# Patient Record
Sex: FEMALE
[De-identification: names, ages, dates, MRNs, and addresses within clinical notes are randomized per-mention and may not be internally consistent; named-entity substitution may affect disease eponyms.]

---

## 2018-03-17 ENCOUNTER — HOSPITAL ENCOUNTER (EMERGENCY)
Dept: HOSPITAL 97 - ER | Age: 23
Discharge: HOME | End: 2018-03-17
Payer: MEDICAID
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
## 2018-03-17 DIAGNOSIS — S61.211A: Primary | ICD-10-CM
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
## 2018-03-17 PROCEDURE — 99281 EMR DPT VST MAYX REQ PHY/QHP: CPT

## 2018-03-17 PROCEDURE — 90714 TD VACC NO PRESV 7 YRS+ IM: CPT

## 2018-03-17 NOTE — EDPHYS
Physician Documentation                                                                           

 [facility]                                                                

Name: Ashley Solorzano                                                                                 

Age: 22 yrs                                                                                       

Sex: Female                                                                                       

: 1995                                                                                   

MRN: [id number]                                                                                   

Arrival Date: 2018                                                                          

Time: 11:06                                                                                       

Account#: [id number]                                                                            

Bed 12                                                                                            

Private MD:                                                                                       

ED Physician Lani Dockery                                                                    

HPI:                                                                                              

                                                                                             

12:30 This 22 yrs old  Female presents to ER via Ambulatory with complaints of Finger kb  

      Laceration.                                                                                 

12:30 The patient has a laceration related to: cooking, from a knife, occurred at home, and   kb  

      there are no complicating factors. The injury was accidental. The laceration(s) is(are)     

      located on the dorsal aspect of middle phalanx of left index finger. Onset: The             

      symptoms/episode began/occurred last night. Associated signs and symptoms: The patient      

      has no apparent associated signs or symptoms. The patient has not experienced similar       

      symptoms in the past. The patient has not recently seen a physician.                        

                                                                                                  

OB/GYN:                                                                                           

11:16 LMP 2018                                                                             

                                                                                                  

Historical:                                                                                       

- Allergies:                                                                                      

11:15 No Known Allergies;                                                                       

- Home Meds:                                                                                      

11:15 None [Active];                                                                          hj  

- PMHx:                                                                                           

11:15 None;                                                                                   hj  

- PSHx:                                                                                           

11:15 None;                                                                                   hj  

                                                                                                  

- Immunization history:: Adult Immunizations unknown.                                             

- Social history:: Smoking status: Patient/guardian denies using tobacco.                         

                                                                                                  

                                                                                                  

ROS:                                                                                              

12:30 Constitutional: Negative for fever, chills, and weight loss, Neck: Negative for injury, kb  

      pain, and swelling, Cardiovascular: Negative for chest pain, palpitations, and edema,       

      Respiratory: Negative for shortness of breath, cough, wheezing, and pleuritic chest         

      pain, Abdomen/GI: Negative for abdominal pain, nausea, vomiting, diarrhea, and              

      constipation, Back: Negative for injury and pain, Neuro: Negative for headache,             

      weakness, numbness, tingling, and seizure.                                                  

12:30 Skin: Positive for laceration(s), of the dorsal aspect of middle phalanx of left index      

      finger.                                                                                     

                                                                                                  

Exam:                                                                                             

12:30 Constitutional:  This is a well developed, well nourished patient who is awake, alert,  kb  

      and in no acute distress. Head/Face:  Normocephalic, atraumatic. Chest/axilla:  Normal      

      chest wall appearance and motion.  Nontender with no deformity.  No lesions are             

      appreciated. Cardiovascular:  Regular rate and rhythm with a normal S1 and S2.  No          

      gallops, murmurs, or rubs.  Normal PMI, no JVD.  No pulse deficits. Respiratory:  Lungs     

      have equal breath sounds bilaterally, clear to auscultation and percussion.  No rales,      

      rhonchi or wheezes noted.  No increased work of breathing, no retractions or nasal          

      flaring. Abdomen/GI:  Soft, non-tender, with normal bowel sounds.  No distension or         

      tympany.  No guarding or rebound.  No evidence of tenderness throughout. Back:  No          

      spinal tenderness.  No costovertebral tenderness.  Full range of motion. MS/ Extremity:     

       Pulses equal, no cyanosis.  Neurovascular intact.  Full, normal range of motion.           

      Neuro:  Awake and alert, GCS 15, oriented to person, place, time, and situation.            

      Cranial nerves II-XII grossly intact.  Motor strength 5/5 in all extremities.  Sensory      

      grossly intact.  Cerebellar exam normal.  Normal gait.                                      

12:30 Skin: injury, laceration(s), the wound is approximately  1.5 cm(s), of the dorsal           

      aspect of middle phalanx of left index finger, that can be described as clean, no           

      foreign body, irregular, without bleeding.                                                  

                                                                                                  

Vital Signs:                                                                                      

11:16  / 65; Pulse 75; Resp 18; Temp 97.2(TE); Pulse Ox 100% on R/A; Weight 48.99 kg;   hj  

      Height 5 ft. 3 in. (160.02 cm); Pain 5/10;                                                  

11:16 Body Mass Index 19.13 (48.99 kg, 160.02 cm)                                               

                                                                                                  

MDM:                                                                                              

11:31 Patient medically screened.                                                             kb  

12:30 Data reviewed: vital signs, nurses notes. Data interpreted: Pulse oximetry: on room air kb  

      is 100 %. Interpretation: normal. Counseling: I had a detailed discussion with the          

      patient and/or guardian regarding: the historical points, exam findings, and any            

      diagnostic results supporting the discharge/admit diagnosis, the need for outpatient        

      follow up, a family practitioner, to return to the emergency department if symptoms         

      worsen or persist or if there are any questions or concerns that arise at home.             

                                                                                                  

                                                                                             

12:03 Order name: Wound Care: wash with clorahexadine and place steristrip; Complete Time:    kb  

      13:03                                                                                       

                                                                                             

12:03 Order name: Finger Splint; Complete Time: 13:03                                         kb  

                                                                                                  

Administered Medications:                                                                         

13:02 Not Given (pt refused. Pt and  reports, "We are doing natural things."):         brain  

      Tetanus-Diphtheria Toxoid Adult 0.5 ml IM once                                              

                                                                                                  

                                                                                                  

Disposition:                                                                                      

18:39 Co-signature as Attending Physician, Lani Dockery MD.                               ma2 

                                                                                                  

Disposition:                                                                                      

18 12:33 Discharged to Home. Impression: Laceration without foreign body of left index      

  finger without damage to nail.                                                                  

- Condition is Stable.                                                                            

- Discharge Instructions: Non-Sutured Laceration.                                                 

                                                                                                  

- Medication Reconciliation Form, Thank You Letter, Antibiotic Education, Prescription            

  Opioid Use form.                                                                                

- Follow up: Emergency Department; When: As needed; Reason: Worsening of condition.               

  Follow up: Private Physician; When: 2 - 3 days; Reason: Recheck today's complaints,             

  Continuance of care, Re-evaluation by your physician.                                           

                                                                                                  

                                                                                                  

                                                                                                  

Signatures:                                                                                       

Shante Paul, YANIRA-GRACIELA DOYLE-Angelica Galeano RN RN ss Joaquin, Henry, RN RN hj Alzahri, Mohammad, MD MD   ma2                                                  

                                                                                                  

**************************************************************************************************

## 2018-03-17 NOTE — ER
Nurse's Notes                                                                                     

 [facility]                                                                

Name: Ashley Solorzano                                                                                 

Age: 22 yrs                                                                                       

Sex: Female                                                                                       

: 1995                                                                                   

MRN: [id number]                                                                                   

Arrival Date: 2018                                                                          

Time: 11:06                                                                                       

Account#: [id number]                                                                            

Bed 12                                                                                            

Private MD:                                                                                       

Diagnosis: Laceration without foreign body of left index finger without damage to nail            

                                                                                                  

Presentation:                                                                                     

                                                                                             

11:13 Presenting complaint: Patient states: last night, i was slicing tomatoes and i          hj  

      accidentally, cut my L index finger, around 8pm;. Transition of care: patient was not       

      received from another setting of care. Onset of symptoms was 2018. Care prior     

      to arrival: None.                                                                           

11:13 Method Of Arrival: Ambulatory                                                             

11:13 Acuity: SHAD 4                                                                           hj  

                                                                                                  

Triage Assessment:                                                                                

11:15 General: Appears in no apparent distress. uncomfortable, Behavior is calm, cooperative, hj  

      appropriate for age. Pain: Complains of pain in dorsal aspect of middle phalanx of left     

      index finger.                                                                               

                                                                                                  

OB/GYN:                                                                                           

11:16 LMP 2018                                                                             

                                                                                                  

Historical:                                                                                       

- Allergies:                                                                                      

11:15 No Known Allergies;                                                                     hj  

- Home Meds:                                                                                      

11:15 None [Active];                                                                          hj  

- PMHx:                                                                                           

11:15 None;                                                                                   hj  

- PSHx:                                                                                           

11:15 None;                                                                                   hj  

                                                                                                  

- Immunization history:: Adult Immunizations unknown.                                             

- Social history:: Smoking status: Patient/guardian denies using tobacco.                         

                                                                                                  

                                                                                                  

Screenin:26 Abuse screen: Denies threats or abuse. Denies injuries from another. Nutritional        ss  

      screening: No deficits noted. Tuberculosis screening: No symptoms or risk factors           

      identified. Never had TB. Fall Risk None identified.                                        

                                                                                                  

Assessment:                                                                                       

11:45 General: Appears in no apparent distress. comfortable, Behavior is cooperative,         ss  

      anxious, quiet. Pain: Complains of pain in dorsal aspect of middle phalanx of left          

      index finger Pain currently is 5 out of 10 on a pain scale. Quality of pain is              

      described as tender, Pain began last night Is continuous. Neuro: Level of Consciousness     

      is awake, alert, obeys commands. Cardiovascular: Capillary refill < 3 seconds is brisk      

      in bilateral fingers. Respiratory: Respiratory effort is even, unlabored. Derm: Skin is     

      intact, is healthy with good turgor, Skin is pink, warm \T\ dry. Injury Description:        

      Laceration sustained to dorsal aspect of middle phalanx of left index finger is clean,      

      0.5 to 2.5 cm long, no active bleeding noted at this time.                                  

                                                                                                  

Vital Signs:                                                                                      

11:16  / 65; Pulse 75; Resp 18; Temp 97.2(TE); Pulse Ox 100% on R/A; Weight 48.99 kg;   hj  

      Height 5 ft. 3 in. (160.02 cm); Pain 5/10;                                                  

11:16 Body Mass Index 19.13 (48.99 kg, 160.02 cm)                                             hj  

                                                                                                  

ED Course:                                                                                        

11:06 Patient arrived in ED.                                                                  mr  

11:14 Triage completed.                                                                       hj  

11:16 Arm band placed on right wrist.                                                         hj  

11:22 Angelica Bates, RN is Primary Nurse.                                                    ss  

11:23 Shante Paul FNP-C is PHCP.                                                        kb  

11:23 Lani Dockery MD is Attending Physician.                                           kb  

11:26 Patient has correct armband on for positive identification. Bed in low position. Call   ss  

      light in reach.                                                                             

13:03 No provider procedures requiring assistance completed. Patient did not have IV access   ss  

      during this emergency room visit. Dressings: pt washed hands with soap and water.           

      Hibiclens rinse. finger splint applied.                                                     

                                                                                                  

Administered Medications:                                                                         

13:02 Not Given (pt refused. Pt and  reports, "We are doing natural things."):         ss  

      Tetanus-Diphtheria Toxoid Adult 0.5 ml IM once                                              

                                                                                                  

                                                                                                  

Outcome:                                                                                          

12:33 Discharge ordered by MD.                                                                kb  

13:03 Discharged to home ambulatory, with family.                                             ss  

13:03 Condition: good                                                                             

13:03 Discharge instructions given to patient, family, Instructed on discharge instructions,      

      follow up and referral plans. Demonstrated understanding of instructions, follow-up         

      care.                                                                                       

13:05 Patient left the ED.                                                                    ss  

                                                                                                  

Signatures:                                                                                       

Shante Paul FNP-C FNP-Edin                                                   

Laurie Hernandez                                mr                                                   

Angelica Bates, RN                      RN                                                      

Ignacio Coronel RN RN   hj                                                   

                                                                                                  

Corrections: (The following items were deleted from the chart)                                    

11:25 11:13 Presenting complaint: Patient states: last night, i was slicing tomatoes and i    hj  

      accidentally, cut my L index, around 8pm; hj                                                

                                                                                                  

**************************************************************************************************

## 2018-05-30 ENCOUNTER — HOSPITAL ENCOUNTER (EMERGENCY)
Dept: HOSPITAL 97 - ER | Age: 23
Discharge: HOME | End: 2018-05-30
Payer: MEDICAID

## 2018-05-30 DIAGNOSIS — R53.81: ICD-10-CM

## 2018-05-30 DIAGNOSIS — E86.9: Primary | ICD-10-CM

## 2018-05-30 LAB
ALBUMIN SERPL BCP-MCNC: 4 G/DL (ref 3.2–5.5)
ALP SERPL-CCNC: 123 IU/L (ref 42–121)
ALT SERPL W P-5'-P-CCNC: 15 IU/L (ref 10–60)
AST SERPL W P-5'-P-CCNC: 20 IU/L (ref 10–42)
BLD SMEAR INTERP: (no result)
BUN BLD-MCNC: 9 MG/DL (ref 6–20)
GLUCOSE SERPLBLD-MCNC: 89 MG/DL (ref 65–120)
HCT VFR BLD CALC: 41.4 % (ref 36–45)
LIPASE SERPL-CCNC: 27 U/L (ref 22–51)
LYMPHOCYTES # SPEC AUTO: 1.2 K/UL (ref 0.7–4.9)
MCH RBC QN AUTO: 24.8 PG (ref 27–35)
MCV RBC: 78.9 FL (ref 80–100)
MORPHOLOGY BLD-IMP: (no result)
PMV BLD: 9.1 FL (ref 7.6–11.3)
POTASSIUM SERPL-SCNC: 3.7 MEQ/L (ref 3.6–5)
RBC # BLD: 5.25 M/UL (ref 3.86–4.86)
UA COMPLETE W REFLEX CULTURE PNL UR: (no result)

## 2018-05-30 PROCEDURE — 87086 URINE CULTURE/COLONY COUNT: CPT

## 2018-05-30 PROCEDURE — 81015 MICROSCOPIC EXAM OF URINE: CPT

## 2018-05-30 PROCEDURE — 81003 URINALYSIS AUTO W/O SCOPE: CPT

## 2018-05-30 PROCEDURE — 85025 COMPLETE CBC W/AUTO DIFF WBC: CPT

## 2018-05-30 PROCEDURE — 83690 ASSAY OF LIPASE: CPT

## 2018-05-30 PROCEDURE — 96361 HYDRATE IV INFUSION ADD-ON: CPT

## 2018-05-30 PROCEDURE — 84443 ASSAY THYROID STIM HORMONE: CPT

## 2018-05-30 PROCEDURE — 80076 HEPATIC FUNCTION PANEL: CPT

## 2018-05-30 PROCEDURE — 81025 URINE PREGNANCY TEST: CPT

## 2018-05-30 PROCEDURE — 80048 BASIC METABOLIC PNL TOTAL CA: CPT

## 2018-05-30 PROCEDURE — 96360 HYDRATION IV INFUSION INIT: CPT

## 2018-05-30 PROCEDURE — 36415 COLL VENOUS BLD VENIPUNCTURE: CPT

## 2018-05-30 PROCEDURE — 87088 URINE BACTERIA CULTURE: CPT

## 2018-05-30 PROCEDURE — 99284 EMERGENCY DEPT VISIT MOD MDM: CPT

## 2018-05-30 NOTE — EDPHYS
Physician Documentation                                                                           

 [facility]                                                                

Name: Ashley Solorzano                                                                                 

Age: 22 yrs                                                                                       

Sex: Female                                                                                       

: 1995                                                                                   

MRN: [id number]                                                                                   

Arrival Date: 2018                                                                          

Time: 10:54                                                                                       

Account#: [id number]                                                                            

Bed 15                                                                                            

Private MD: None, None                                                                            

ED Physician Beto Brantley                                                                       

HPI:                                                                                              

                                                                                             

13:27 This 22 yrs old  Female presents to ER via Ambulatory with complaints of        snw 

      Vomiting/Diarrhea.                                                                          

13:27 The patient presents to the emergency department with nausea, vomiting, diarrhea.       snw 

      Onset: The symptoms/episode began/occurred suddenly, 3 day(s) ago, and became               

      persistent. Possible causes: unknown. Associated signs and symptoms: The patient has no     

      apparent associated signs or symptoms. Severity of symptoms: At their worst the             

      symptoms were moderate severe. The patient has not experienced similar symptoms in the      

      past. The patient has not recently seen a physician. pt breastfeeding 7 month old,          

      recent 5 lb wt loss, lightheadedness, fatigue.                                              

                                                                                                  

OB/GYN:                                                                                           

11:01 LMP N/A - Breastfeeding                                                                 aa5 

                                                                                                  

Historical:                                                                                       

- Allergies:                                                                                      

11:00 No Known Allergies;                                                                     aa5 

- PMHx:                                                                                           

11:00 None;                                                                                   aa5 

- PSHx:                                                                                           

11:00 None;                                                                                   aa5 

                                                                                                  

- Immunization history:: Adult Immunizations up to date.                                          

- Social history:: Smoking status: Patient/guardian denies using tobacco.                         

- Ebola Screening: : No symptoms or risks identified at this time.                                

                                                                                                  

                                                                                                  

ROS:                                                                                              

13:26 Constitutional: Negative for fever, chills, Positive for fatigue, lightheadedness, and  snw 

      weight loss, Eyes: Negative for injury, pain, redness, and discharge, ENT: Negative for     

      injury, pain, and discharge, Neck: Negative for injury, pain, and swelling,                 

      Cardiovascular: Negative for chest pain, palpitations, and edema, Respiratory: Negative     

      for shortness of breath, cough, wheezing, and pleuritic chest pain, Back: Negative for      

      injury and pain, : Negative for injury, bleeding, discharge, and swelling,                

      MS/Extremity: Negative for injury and deformity.                                            

13:26 Skin: Negative for injury, rash, and discoloration, Psych: Negative for depression,         

      anxiety, suicide ideation, homicidal ideation, and hallucinations.                          

13:26 Abdomen/GI: Positive for nausea and vomiting, diarrhea.                                     

13:26 Neuro: Positive for lightheadedness.                                                        

                                                                                                  

Exam:                                                                                             

13:25 Constitutional:  This is a well developed, well nourished patient who is awake, alert,  snw 

      and in no acute distress.                                                                   

13:25 Head/Face:  Normocephalic, atraumatic. ENT:  Nares patent. No nasal discharge, no           

      septal abnormalities noted.  Tympanic membranes are normal and external auditory canals     

      are clear.  Oropharynx with no redness, swelling, or masses, exudates, or evidence of       

      obstruction, uvula midline.  Mucous membranes moist. Neck:  Trachea midline, no             

      thyromegaly or masses palpated, and no cervical lymphadenopathy.  Supple, full range of     

      motion without nuchal rigidity, or vertebral point tenderness.  No Meningismus.             

      Chest/axilla:  Normal chest wall appearance and motion.  Nontender with no deformity.       

      No lesions are appreciated. Cardiovascular:  Regular rate and rhythm with a normal S1       

      and S2.  No gallops, murmurs, or rubs.  Normal PMI, no JVD.  No pulse deficits.             

      Respiratory:  Lungs have equal breath sounds bilaterally, clear to auscultation and         

      percussion.  No rales, rhonchi or wheezes noted.  No increased work of breathing, no        

      retractions or nasal flaring. Back:  No spinal tenderness.  No costovertebral               

      tenderness.  Full range of motion. Skin:  Warm, dry with normal turgor.  Normal color       

      with no rashes, no lesions, and no evidence of cellulitis. MS/ Extremity:  Pulses           

      equal, no cyanosis.  Neurovascular intact.  Full, normal range of motion. Neuro:  Awake     

      and alert, GCS 15, oriented to person, place, time, and situation.  Cranial nerves          

      II-XII grossly intact.  Motor strength 5/5 in all extremities.  Sensory grossly intact.     

       Cerebellar exam normal.  Normal gait.                                                      

13:25 Eyes: Periorbital structures: ecchymosis, that is mild, bilaterally, Extraocular            

      movements: no acute changes, Conjunctiva: normal, Corneas: are normal.                      

13:25 Abdomen/GI: Inspection: abdomen appears normal, Bowel sounds: normal, Palpation:            

      abdomen is soft and non-tender, in all quadrants.                                           

                                                                                                  

Vital Signs:                                                                                      

11:01  / 63; Pulse 78; Resp 16 S; Temp 97.8; Pulse Ox 100% on R/A; Pain 0/10;           aa5 

12:00 Pulse 67; Resp 17; Pulse Ox 100% on R/A;                                                tw2 

12:48  / 78; Pulse 109; Resp 18; Pulse Ox 100% on R/A;                                  tw2 

14:01  / 56; Pulse 90; Resp 17; Pulse Ox 100% on R/A;                                   tw2 

15:02 Pulse 90; Resp 17; Pulse Ox 100% on R/A;                                                tw2 

                                                                                                  

MDM:                                                                                              

11:30 Patient medically screened.                                                             snw 

16:00 Data reviewed: vital signs, nurses notes. Data interpreted: Pulse oximetry: on room air snw 

      is 100 %. Interpretation: normal. Counseling: I had a detailed discussion with the          

      patient and/or guardian regarding: the historical points, exam findings, and any            

      diagnostic results supporting the discharge/admit diagnosis, lab results, the need for      

      outpatient follow up, to return to the emergency department if symptoms worsen or           

      persist or if there are any questions or concerns that arise at home. Special               

      discussion: Based on the history and exam findings, there is no indication for further      

      emergent testing or inpatient evaluation. I discussed with the patient/guardian the         

      need to see the primary care provider for further evaluation of the symptoms.               

                                                                                                  

                                                                                             

11:07 Order name: Urine Culture                                                               sn 

                                                                                             

11:07 Order name: Urine Microscopic Only; Complete Time: 11:50                                snw 

                                                                                             

11:30 Order name: Urine Dipstick--Ancillary (enter results); Complete Time: 11:33             ag  

                                                                                             

11:30 Order name: Urine Pregnancy--Ancillary (enter results); Complete Time: 11:33            ag  

                                                                                             

11:33 Order name: Basic Metabolic Panel; Complete Time: 12:25                                 snw 

                                                                                             

11:33 Order name: CBC with Diff; Complete Time: 12:54                                         snw 

                                                                                             

11:33 Order name: Hepatic Function; Complete Time: 12:25                                      snw 

                                                                                             

11:33 Order name: Lipase; Complete Time: 12:25                                                snw 

                                                                                             

12:00 Order name: CBC Smear Scan; Complete Time: 12:54                                        EDMS

                                                                                             

12:02 Order name: Add On-Lab                                                                  snw 

                                                                                             

12:09 Order name: Thyroid Stimulating Hormone; Complete Time: 13:13                           EDMS

                                                                                             

11:07 Order name: Urine Pregnancy Test (obtain specimen); Complete Time: 11:30                snw 

                                                                                             

11:07 Order name: Urine Dipstick-Ancillary (obtain specimen); Complete Time: 11:30            snw 

                                                                                             

11:33 Order name: IV Saline Lock; Complete Time: 11:45                                        snw 

                                                                                             

11:33 Order name: Labs collected and sent; Complete Time: :45                               snw 

                                                                                                  

Administered Medications:                                                                         

11:51 Drug: NS 0.9% 1000 ml Route: IV; Rate: 1 bolus; Site: left antecubital;                 tw2 

13:39 Follow up: Response: No adverse reaction; IV Status: Completed infusion; IV Intake:     tw2 

      1000ml                                                                                      

13:38 Drug: NS 0.9% 1000 ml Route: IV; Rate: 1 bolus; Site: left antecubital;                 tw2 

16:29 Follow up: Response: No adverse reaction; IV Status: Completed infusion; IV Intake:     tw2 

      1000ml                                                                                      

16:13 Drug: Zofran 4 mg Route: PO;                                                            tw2 

16:29 Follow up: Response: No adverse reaction                                                tw2 

                                                                                                  

                                                                                                  

Disposition:                                                                                      

16:49 Co-signature as Attending Physician, Beto Brantley MD I agree with the assessment and   kdr 

      plan of care.                                                                               

                                                                                                  

Disposition:                                                                                      

18 15:06 Discharged to Home. Impression: Volume depletion, Malaise and fatigue.             

- Condition is Stable.                                                                            

- Discharge Instructions: Dehydration, Adult, Fatigue, Rehydration, Adult.                        

- Prescriptions for Prenatal Vitamin 27- 0.8 mg Oral Tablet - take 1 tablet by ORAL               

  route once daily; 60 tablet.                                                                    

- Medication Reconciliation Form, Thank You Letter, Antibiotic Education, Prescription            

  Opioid Use form.                                                                                

- Follow up: Private Physician; When: 1 - 2 days; Reason: Recheck today's complaints,             

  Continuance of care, Re-evaluation by your physician. Follow up: Emergency                      

  Department; When: As needed; Reason: Worsening of condition.                                    

                                                                                                  

                                                                                                  

                                                                                                  

Signatures:                                                                                       

Dispatcher [employer]                           [facility]                                                 

Beto Brantley MD MD kdr Therrien, Shelly, FNP-C                 FNP-Csnw                                                  

Amy Winters, RN                     RN   aa5                                                  

Drea Schuler RN                          RN   tw2                                                  

                                                                                                  

Corrections: (The following items were deleted from the chart)                                    

16:32 15:06 2018 15:06 Discharged to Home. Impression: Volume depletion; Malaise and    tw2 

      fatigue. Condition is Stable. Forms are Medication Reconciliation Form, Thank You           

      Letter, Antibiotic Education, Prescription Opioid Use. Follow up: Private Physician;        

      When: 1 - 2 days; Reason: Recheck today's complaints, Continuance of care,                  

      Re-evaluation by your physician. Follow up: Emergency Department; When: As needed;          

      Reason: Worsening of condition. snw                                                         

                                                                                                  

**************************************************************************************************

## 2018-05-30 NOTE — ER
Nurse's Notes                                                                                     

 [facility]                                                                

Name: Ashley Solorzano                                                                                 

Age: 22 yrs                                                                                       

Sex: Female                                                                                       

: 1995                                                                                   

MRN: [id number]                                                                                   

Arrival Date: 2018                                                                          

Time: 10:54                                                                                       

Account#: [id number]                                                                            

Bed 15                                                                                            

Private MD: None, None                                                                            

Diagnosis: Volume depletion;Malaise and fatigue                                                   

                                                                                                  

Presentation:                                                                                     

                                                                                             

10:59 Presenting complaint: Patient states: nausea and diarrhea x 2-3 days ago. Pt denies     aa5 

      vomiting. Pt states "I get stomach cramps when I eat and it makes me nauseated".            

      Transition of care: patient was not received from another setting of care. Onset of         

      symptoms was May 2018. Risk Assessment: Do you want to hurt yourself or someone else?       

      Patient reports no desire to harm self or others. Initial Sepsis Screen: Does the           

      patient meet any 2 criteria? No. Patient's initial sepsis screen is negative. Does the      

      patient have a suspected source of infection? No. Patient's initial sepsis screen is        

      negative. Care prior to arrival: None.                                                      

10:59 Method Of Arrival: Ambulatory                                                           aa5 

10:59 Acuity: SHAD 3                                                                           aa5 

                                                                                                  

Triage Assessment:                                                                                

14:17 General: Appears. GI: Reports lower abdominal pain.                                     tw2 

                                                                                                  

OB/GYN:                                                                                           

11:01 LMP N/A - Breastfeeding                                                                 aa5 

                                                                                                  

Historical:                                                                                       

- Allergies:                                                                                      

11:00 No Known Allergies;                                                                     aa5 

- PMHx:                                                                                           

11:00 None;                                                                                   aa5 

- PSHx:                                                                                           

11:00 None;                                                                                   aa5 

                                                                                                  

- Immunization history:: Adult Immunizations up to date.                                          

- Social history:: Smoking status: Patient/guardian denies using tobacco.                         

- Ebola Screening: : No symptoms or risks identified at this time.                                

                                                                                                  

                                                                                                  

Screenin:17 Abuse screen: Denies threats or abuse. Nutritional screening: No deficits noted.        tw2 

      Tuberculosis screening: No symptoms or risk factors identified. Fall Risk None              

      identified.                                                                                 

                                                                                                  

Assessment:                                                                                       

11:10 General: Appears in no apparent distress. slender, Behavior is calm, cooperative,       tw2 

      appropriate for age. Pain: Complains of pain in abdomen Aggravated by eating. Neuro:        

      Level of Consciousness is awake, alert, obeys commands, Oriented to person, place,          

      time, situation. Cardiovascular: Denies chest pain, shortness of breath, Heart tones S1     

      S2 Capillary refill < 3 seconds Patient's skin is warm and dry. Respiratory: Airway is      

      patent Breath sounds are clear. GI: Abdomen is flat, Bowel sounds present X 4 quads.        

      : No signs and/or symptoms were reported regarding the genitourinary system. EENT: No     

      signs and/or symptoms were reported regarding the EENT system. Derm: No signs and/or        

      symptoms reported regarding the dermatologic system. Skin temperature is warm.              

      Musculoskeletal: Range of motion: intact in all extremities.                                

11:51 Reassessment: pt eating an apple at this time, instructed pt nothing by mouth until     tw2 

      results come back.                                                                          

12:49 Reassessment: Patient appears in no apparent distress at this time. No changes from     tw2 

      previously documented assessment. Patient and/or family updated on plan of care and         

      expected duration. Pain level reassessed. Patient is alert, oriented x 3, equal             

      unlabored respirations, skin warm/dry/pink.                                                 

14:02 Reassessment: Patient appears in no apparent distress at this time. No changes from     tw2 

      previously documented assessment. Patient and/or family updated on plan of care and         

      expected duration. Pain level reassessed. Patient is alert, oriented x 3, equal             

      unlabored respirations, skin warm/dry/pink.                                                 

15:05 Reassessment: Patient appears in no apparent distress at this time. No changes from     tw2 

      previously documented assessment. Patient and/or family updated on plan of care and         

      expected duration. Pain level reassessed. Patient is alert, oriented x 3, equal             

      unlabored respirations, skin warm/dry/pink.                                                 

16:07 Reassessment: Patient appears in no apparent distress at this time. No changes from     tw2 

      previously documented assessment. Patient and/or family updated on plan of care and         

      expected duration. Pain level reassessed. Patient is alert, oriented x 3, equal             

      unlabored respirations, skin warm/dry/pink. provider at bedside at this time.               

16:30 Reassessment: Patient appears in no apparent distress at this time. No changes from     tw2 

      previously documented assessment. Patient and/or family updated on plan of care and         

      expected duration. Pain level reassessed. Patient is alert, oriented x 3, equal             

      unlabored respirations, skin warm/dry/pink.                                                 

                                                                                                  

Vital Signs:                                                                                      

11:01  / 63; Pulse 78; Resp 16 S; Temp 97.8; Pulse Ox 100% on R/A; Pain 0/10;           aa5 

12:00 Pulse 67; Resp 17; Pulse Ox 100% on R/A;                                                tw2 

12:48  / 78; Pulse 109; Resp 18; Pulse Ox 100% on R/A;                                  tw2 

14:01  / 56; Pulse 90; Resp 17; Pulse Ox 100% on R/A;                                   tw2 

15:02 Pulse 90; Resp 17; Pulse Ox 100% on R/A;                                                tw2 

                                                                                                  

ED Course:                                                                                        

10:54 Patient arrived in ED.                                                                  mr  

10:54 None, None is Private Physician.                                                        mr  

11:00 Triage completed.                                                                       aa5 

11:00 Arm band placed on.                                                                     aa5 

11:06 [facility]                                                        snw 

11:06 Beto Brantley MD is Attending Physician.                                              snw 

11:10 Bed in low position. Call light in reach. Pulse ox on. NIBP on. Warm blanket given.     tw2 

11:11 Drea Schuler, CARA is Primary Nurse.                                                        tw2 

11:15 Inserted saline lock: 22 gauge in left antecubital area, using aseptic technique. Blood tw2 

      collected.                                                                                  

15:10 Awaiting: completion of IV fluids PRIOR to discharge.                                   tw2 

16:07 No provider procedures requiring assistance completed.                                  tw2 

16:30 IV discontinued, intact, bleeding controlled, No redness/swelling at site. Pressure     tw2 

      dressing applied.                                                                           

                                                                                                  

Administered Medications:                                                                         

11:51 Drug: NS 0.9% 1000 ml Route: IV; Rate: 1 bolus; Site: left antecubital;                 tw2 

13:39 Follow up: Response: No adverse reaction; IV Status: Completed infusion; IV Intake:     tw2 

      1000ml                                                                                      

13:38 Drug: NS 0.9% 1000 ml Route: IV; Rate: 1 bolus; Site: left antecubital;                 tw2 

16:29 Follow up: Response: No adverse reaction; IV Status: Completed infusion; IV Intake:     tw2 

      1000ml                                                                                      

16:13 Drug: Zofran 4 mg Route: PO;                                                            tw2 

16:29 Follow up: Response: No adverse reaction                                                tw2 

                                                                                                  

                                                                                                  

Intake:                                                                                           

13:39 IV: 1000ml; Total: 1000ml.                                                              tw2 

16:29 IV: 1000ml; Total: 2000ml.                                                              tw2 

                                                                                                  

Outcome:                                                                                          

15:06 Discharge ordered by MD.                                                                snw 

16:30 Discharged to home ambulatory, with family.                                             tw2 

16:30 Condition: stable                                                                           

16:30 Discharge instructions given to patient, family, Instructed on discharge instructions,      

      follow up and referral plans. medication usage, Demonstrated understanding of               

      instructions, follow-up care, medications, Prescriptions given X 1.                         

16:32 Patient left the ED.                                                                    tw2 

                                                                                                  

Signatures:                                                                                       

Blanca Jarvis, YANIRA-C                 FNP-Csnw                                                  

Laurie Hernandez                                mr Winters, Amy, RN                     RN   aa5                                                  

Drea Schuler RN                          RN   tw2                                                  

                                                                                                  

Corrections: (The following items were deleted from the chart)                                    

11:02 11:01  / 63; Pulse 78bpm; Resp 16bpm; Spontaneous; Pulse Ox 100% RA; Temp 97.8F;  aa5 

      aa5                                                                                         

                                                                                                  

**************************************************************************************************